# Patient Record
(demographics unavailable — no encounter records)

---

## 2025-05-09 NOTE — HISTORY OF PRESENT ILLNESS
[de-identified] : 70 year old male complaining of pain left hip. Patient report having missed a step while walking one week ago and jammed left leg and developed acute left thigh pain. 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 Stover, right total hip replacement 8 years ago. Patient reports pain when walking.

## 2025-05-09 NOTE — PHYSICAL EXAM
[de-identified] : Constitutional: Well nourished , well developed and in no acute distress Psychiatric: Alert and oriented to time place and person.Appropriate affect . Skin: Head, neck, arms and lower extremities:no lesions or discoloration HEENT: Normocephalic, EOM intact, Nasal septum midline, Respiratory: Unlabored respirations,no audible wheezing ,no tachypnea, no cyanosis Cardiovascular: No leg swelling no ankle edema no JVD, pulse regular Vascular: No calf or thigh tenderness,  Peripheral pulses: intact Lymphatics: No groin adenopathy,no lymphedema lower or upper extremities  Left Hip Inspection/Palpation : no tenderness, no swelling, no deformity Leg Lengths: equal Passive range of motion: flexion  degrees, internal  degrees, external  degrees, abduction  degrees, adduction  degrees Strength: resisted hip flexion 5/5, resisted hip abduction 5/5 Skin : no erythema, no ecchymosis Sensation : sensation to light touch intact Vascular Exam : no edema, no cyanosis, dorsalis pedis artery pulse 1+, posterior tibial artery pulse 2+ Left hip flexion 90 degrees, internal 5 degrees, external 25 degrees, abduction and adduction 30 degrees, pain free.Resisted left hip flexion provokes proximal thigh pain positive femoral stretch provocation proximal thigh pain      [de-identified] : X-ray AP pelvis right hip left hip right and left total hip components satisfactory alignment no loosening or fracture

## 2025-05-09 NOTE — ASSESSMENT
[FreeTextEntry1] : 70 year male presents with left hip pain. Patient will get an MRI of the hip to further evaluate. An MRI was ordered today. Follow up after MRI to discuss results and further treatment options.

## 2025-05-09 NOTE — DISCUSSION/SUMMARY
[de-identified] : Impression left total hip replacement, rule out iliopsoas strain, rule out rectus strain Plan MRI left hip